# Patient Record
(demographics unavailable — no encounter records)

---

## 2024-11-25 NOTE — ASSESSMENT
[FreeTextEntry1] : COPD, active smoker Continue as needed albuterol Pt Does not appear ready to discuss smoking cessation at this time Follow up in 6 months

## 2024-11-25 NOTE — ADDENDUM
[FreeTextEntry1] : I, Issa Dipaknaseemgeorgette, acted solely as a scribe for Dr. Kendall Barbour M.D. on this date 11/25/2024.   All medical record entries made by the Scribe were at my, Dr. Kendall Barbour M.D., direction and personally dictated by me on 11/25/2024. I have reviewed the chart and agree that the record accurately reflects my personal performance of the history, physical exam, assessment and plan. I have also personally directed, reviewed, and agreed with the chart.

## 2024-11-25 NOTE — HISTORY OF PRESENT ILLNESS
[TextBox_4] : 85 year old male presents for follow up office visit regarding COPD.  Pt notes he continues to smoke. Pt uses albuterol inhaler twice a day which provides relief. Pt notes he has already received recent influenza vaccination. Does not appear motivated to quit smoking at this time
